# Patient Record
Sex: MALE | Race: WHITE | Employment: FULL TIME | URBAN - METROPOLITAN AREA
[De-identification: names, ages, dates, MRNs, and addresses within clinical notes are randomized per-mention and may not be internally consistent; named-entity substitution may affect disease eponyms.]

---

## 2017-09-07 ENCOUNTER — HOSPITAL ENCOUNTER (OUTPATIENT)
Age: 37
Setting detail: SPECIMEN
Discharge: HOME | End: 2017-09-07
Payer: COMMERCIAL

## 2017-09-07 DIAGNOSIS — E29.1 MALE HYPOGONADISM: ICD-10-CM

## 2017-09-07 DIAGNOSIS — E55.9 VITAMIN D DEFICIENCY: ICD-10-CM

## 2017-09-07 LAB
ALT SERPL-CCNC: 34 U/L (ref 5–41)
ANION GAP SERPL CALCULATED.3IONS-SCNC: 11 MMOL/L (ref 9–17)
AST SERPL-CCNC: 36 U/L
BUN BLDV-MCNC: 19 MG/DL (ref 6–20)
BUN/CREAT BLD: ABNORMAL (ref 9–20)
CALCIUM SERPL-MCNC: 9.5 MG/DL (ref 8.6–10.4)
CHLORIDE BLD-SCNC: 94 MMOL/L (ref 98–107)
CO2: 30 MMOL/L (ref 20–31)
CREAT SERPL-MCNC: 1.1 MG/DL (ref 0.7–1.2)
ESTRADIOL LEVEL: 30 PG/ML (ref 27–52)
GFR AFRICAN AMERICAN: >60 ML/MIN
GFR NON-AFRICAN AMERICAN: >60 ML/MIN
GFR SERPL CREATININE-BSD FRML MDRD: ABNORMAL ML/MIN/{1.73_M2}
GFR SERPL CREATININE-BSD FRML MDRD: ABNORMAL ML/MIN/{1.73_M2}
GLUCOSE FASTING: 95 MG/DL (ref 70–99)
POTASSIUM SERPL-SCNC: 4.3 MMOL/L (ref 3.7–5.3)
PROSTATE SPECIFIC ANTIGEN: 0.7 UG/L
SEX HORMONE BINDING GLOBULIN: 31 NMOL/L (ref 11–80)
SODIUM BLD-SCNC: 135 MMOL/L (ref 135–144)
TESTOSTERONE FREE-NONMALE: 100.9 PG/ML (ref 47–244)
TESTOSTERONE TOTAL: 468 NG/DL (ref 220–1000)
VITAMIN D 25-HYDROXY: 36.4 NG/ML (ref 30–100)

## 2017-12-07 ENCOUNTER — HOSPITAL ENCOUNTER (OUTPATIENT)
Age: 37
Setting detail: SPECIMEN
Discharge: HOME | End: 2017-12-07
Payer: COMMERCIAL

## 2017-12-07 DIAGNOSIS — E28.0 ESTRADIOL EXCESS: ICD-10-CM

## 2017-12-07 DIAGNOSIS — E29.1 MALE HYPOGONADISM: ICD-10-CM

## 2017-12-07 DIAGNOSIS — E55.9 VITAMIN D DEFICIENCY: ICD-10-CM

## 2017-12-07 LAB
ESTRADIOL LEVEL: 20 PG/ML (ref 27–52)
SEX HORMONE BINDING GLOBULIN: 33 NMOL/L (ref 11–80)
TESTOSTERONE FREE-NONMALE: 153.7 PG/ML (ref 47–244)
TESTOSTERONE TOTAL: 691 NG/DL (ref 220–1000)
VITAMIN D 25-HYDROXY: 49 NG/ML (ref 30–100)

## 2021-04-27 ENCOUNTER — HOSPITAL ENCOUNTER (OUTPATIENT)
Age: 41
Setting detail: SPECIMEN
Discharge: HOME | End: 2021-04-27
Payer: COMMERCIAL

## 2021-04-27 DIAGNOSIS — E55.9 VITAMIN D DEFICIENCY: ICD-10-CM

## 2021-04-27 DIAGNOSIS — R53.83 OTHER FATIGUE: ICD-10-CM

## 2021-04-27 DIAGNOSIS — E78.5 HYPERLIPIDEMIA, UNSPECIFIED HYPERLIPIDEMIA TYPE: ICD-10-CM

## 2021-04-27 LAB
ABSOLUTE EOS #: 0.21 K/UL (ref 0–0.44)
ABSOLUTE IMMATURE GRANULOCYTE: <0.03 K/UL (ref 0–0.3)
ABSOLUTE LYMPH #: 1.65 K/UL (ref 1.1–3.7)
ABSOLUTE MONO #: 0.52 K/UL (ref 0.1–1.2)
ALT SERPL-CCNC: 37 U/L (ref 5–41)
ANION GAP SERPL CALCULATED.3IONS-SCNC: 7 MMOL/L (ref 9–17)
AST SERPL-CCNC: 32 U/L
BASOPHILS # BLD: 0 % (ref 0–2)
BASOPHILS ABSOLUTE: <0.03 K/UL (ref 0–0.2)
BUN BLDV-MCNC: 17 MG/DL (ref 6–20)
BUN/CREAT BLD: ABNORMAL (ref 9–20)
CALCIUM SERPL-MCNC: 9.4 MG/DL (ref 8.6–10.4)
CHLORIDE BLD-SCNC: 99 MMOL/L (ref 98–107)
CHOLESTEROL/HDL RATIO: 3.5
CHOLESTEROL: 143 MG/DL
CO2: 31 MMOL/L (ref 20–31)
CREAT SERPL-MCNC: 1.05 MG/DL (ref 0.7–1.2)
DIFFERENTIAL TYPE: NORMAL
EOSINOPHILS RELATIVE PERCENT: 4 % (ref 1–4)
GFR AFRICAN AMERICAN: >60 ML/MIN
GFR NON-AFRICAN AMERICAN: >60 ML/MIN
GFR SERPL CREATININE-BSD FRML MDRD: ABNORMAL ML/MIN/{1.73_M2}
GFR SERPL CREATININE-BSD FRML MDRD: ABNORMAL ML/MIN/{1.73_M2}
GLUCOSE FASTING: 95 MG/DL (ref 70–99)
HCT VFR BLD CALC: 47.1 % (ref 40.7–50.3)
HDLC SERPL-MCNC: 41 MG/DL
HEMOGLOBIN: 15.7 G/DL (ref 13–17)
IMMATURE GRANULOCYTES: 0 %
LDL CHOLESTEROL: 85 MG/DL (ref 0–130)
LYMPHOCYTES # BLD: 32 % (ref 24–43)
MCH RBC QN AUTO: 30.4 PG (ref 25.2–33.5)
MCHC RBC AUTO-ENTMCNC: 33.3 G/DL (ref 28.4–34.8)
MCV RBC AUTO: 91.3 FL (ref 82.6–102.9)
MONOCYTES # BLD: 10 % (ref 3–12)
NRBC AUTOMATED: 0 PER 100 WBC
PDW BLD-RTO: 13.2 % (ref 11.8–14.4)
PLATELET # BLD: 221 K/UL (ref 138–453)
PLATELET ESTIMATE: NORMAL
PMV BLD AUTO: 10.3 FL (ref 8.1–13.5)
POTASSIUM SERPL-SCNC: 4.3 MMOL/L (ref 3.7–5.3)
RBC # BLD: 5.16 M/UL (ref 4.21–5.77)
RBC # BLD: NORMAL 10*6/UL
SEG NEUTROPHILS: 54 % (ref 36–65)
SEGMENTED NEUTROPHILS ABSOLUTE COUNT: 2.76 K/UL (ref 1.5–8.1)
SODIUM BLD-SCNC: 137 MMOL/L (ref 135–144)
TRIGL SERPL-MCNC: 83 MG/DL
TSH SERPL DL<=0.05 MIU/L-ACNC: 2.33 MIU/L (ref 0.3–5)
VITAMIN B-12: 729 PG/ML (ref 232–1245)
VITAMIN D 25-HYDROXY: 30.9 NG/ML (ref 30–100)
VLDLC SERPL CALC-MCNC: NORMAL MG/DL (ref 1–30)
WBC # BLD: 5.2 K/UL (ref 3.5–11.3)
WBC # BLD: NORMAL 10*3/UL

## 2025-01-03 ENCOUNTER — OFFICE VISIT (OUTPATIENT)
Dept: NEUROLOGY | Age: 45
End: 2025-01-03
Payer: COMMERCIAL

## 2025-01-03 VITALS
BODY MASS INDEX: 33.93 KG/M2 | SYSTOLIC BLOOD PRESSURE: 147 MMHG | WEIGHT: 256 LBS | HEIGHT: 73 IN | DIASTOLIC BLOOD PRESSURE: 85 MMHG | HEART RATE: 82 BPM

## 2025-01-03 DIAGNOSIS — G47.411 PRIMARY NARCOLEPSY WITH CATAPLEXY: Primary | ICD-10-CM

## 2025-01-03 DIAGNOSIS — F51.01 PRIMARY INSOMNIA: ICD-10-CM

## 2025-01-03 PROCEDURE — 99204 OFFICE O/P NEW MOD 45 MIN: CPT | Performed by: PSYCHIATRY & NEUROLOGY

## 2025-01-03 PROCEDURE — 3077F SYST BP >= 140 MM HG: CPT | Performed by: PSYCHIATRY & NEUROLOGY

## 2025-01-03 PROCEDURE — 3079F DIAST BP 80-89 MM HG: CPT | Performed by: PSYCHIATRY & NEUROLOGY

## 2025-01-03 RX ORDER — HYDROCORTISONE 25 MG/G
CREAM TOPICAL
COMMUNITY
Start: 2024-11-04

## 2025-01-03 RX ORDER — METOPROLOL SUCCINATE 100 MG/1
1 TABLET, EXTENDED RELEASE ORAL
COMMUNITY
Start: 2024-11-02

## 2025-01-03 RX ORDER — METOPROLOL SUCCINATE 25 MG/1
TABLET, EXTENDED RELEASE ORAL
COMMUNITY
Start: 2024-11-02

## 2025-01-03 RX ORDER — TEMAZEPAM 30 MG/1
CAPSULE ORAL
COMMUNITY
Start: 2004-01-01

## 2025-01-03 RX ORDER — TESTOSTERONE UNDECANOATE 198 MG/1
CAPSULE, LIQUID FILLED ORAL
COMMUNITY

## 2025-01-03 RX ORDER — FLUTICASONE PROPIONATE 50 MCG
SPRAY, SUSPENSION (ML) NASAL
COMMUNITY
Start: 2022-01-01

## 2025-01-03 ASSESSMENT — ENCOUNTER SYMPTOMS
ALLERGIC/IMMUNOLOGIC NEGATIVE: 1
EYES NEGATIVE: 1
GASTROINTESTINAL NEGATIVE: 1
RESPIRATORY NEGATIVE: 1

## 2025-01-03 NOTE — PROGRESS NOTES
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 He was seen in his 20's . Daytime sleepiness had been going on since [employer] . He has had insomnia with trouble falling asleep dating back to childhood . [facility] . Polysomnogram apnea hyponea index 7 episodes per hour ,PLM 7 /hr . MSLT mean sleep latency 4.5 minutes with 4 out of 4 REM onset periods , January 2023 felt to be concordant with narcolepsy . He was evaluated also at [facility] in September 2023  Polysomnogram RDI 9.7 episodes per hour , PLM 38 /hr , September 2003 . MSLT no pathological daytime sleepiness with no REM onset intrusion . He has been on adderall since 2002 before that being on ritalin and provigil . He has been on adderall. He works from 7 AM to 3:30 PM going to bed at 10 PM falling asleep within 30 minutes on restoril 30 mg po qhs . Without restoril he will be unable to fall sleep . He has been on restoril since 2003 . He will take adderall 30 mg 1/2 tablet in morning on awakening and then 1/2 tablet 5 hours later able to stay awake . Without this mediation he is very sleepy taking naps with trouble focusing with brain fog. He does have attention defcit disorder . There is no cataplexy or sleep paralysis or hypnagogic hallucination . Initially he was following locally with Dr Duong getting medication scripts then moving to [facility] getting scripts there with olivia there after some time not wanting to write stimulant or sedative medication . There is mild intermittent snoring at night wearing breath right for nasal congestion . There is no choking or apnea at night . There is some leg jerking at night . Significant medications adderall 30 mg tab 1/2 tablet q AM and 5 hours later . Testing MRI of Head normal , December 2013 . Polysomnogram apnea hypopnea undex 7

## 2025-01-13 DIAGNOSIS — F51.01 PRIMARY INSOMNIA: ICD-10-CM

## 2025-01-13 DIAGNOSIS — G47.411 PRIMARY NARCOLEPSY WITH CATAPLEXY: Primary | ICD-10-CM

## 2025-01-13 NOTE — TELEPHONE ENCOUNTER
Patient calling for refill of Temazepam.      Medication active on med list yes      Date of last fill: unknown, was new pt on 1/3/25  verified on 1/13/2025   verified by SF LPN      Date of last appointment 1/3/25    Next Visit Date:  4/11/2025

## 2025-01-15 RX ORDER — TEMAZEPAM 30 MG/1
30 CAPSULE ORAL NIGHTLY
Qty: 30 CAPSULE | Refills: 0 | Status: SHIPPED | OUTPATIENT
Start: 2025-01-15 | End: 2025-02-14

## 2025-01-27 DIAGNOSIS — R53.82 CHRONIC FATIGUE: Primary | ICD-10-CM

## 2025-01-27 NOTE — TELEPHONE ENCOUNTER
Received a call from patient regarding his previous Adderall prescription. We have not sent in a prescription since 2019; however, patient has been receiving this medication since then from his PCP in [place]. His PCP in [place] will no longer fill it for him, so Dr. Mane stated he would continue filling this for him when he is due for a refill. Please advise if you are willing to fill this while Dr. Mane is out of the office.

## 2025-01-30 RX ORDER — DEXTROAMPHETAMINE SACCHARATE, AMPHETAMINE ASPARTATE, DEXTROAMPHETAMINE SULFATE AND AMPHETAMINE SULFATE 7.5; 7.5; 7.5; 7.5 MG/1; MG/1; MG/1; MG/1
30 TABLET ORAL DAILY
Qty: 30 TABLET | Refills: 0 | Status: SHIPPED | OUTPATIENT
Start: 2025-01-30 | End: 2025-03-01

## 2025-02-13 DIAGNOSIS — F51.01 PRIMARY INSOMNIA: ICD-10-CM

## 2025-02-13 DIAGNOSIS — G47.411 PRIMARY NARCOLEPSY WITH CATAPLEXY: ICD-10-CM

## 2025-02-14 RX ORDER — TEMAZEPAM 30 MG/1
30 CAPSULE ORAL NIGHTLY
Qty: 30 CAPSULE | Refills: 2 | Status: SHIPPED | OUTPATIENT
Start: 2025-02-14 | End: 2025-05-13

## 2025-02-14 RX ORDER — TEMAZEPAM 30 MG/1
30 CAPSULE ORAL NIGHTLY
Qty: 30 CAPSULE | Refills: 0 | OUTPATIENT
Start: 2025-02-14 | End: 2025-03-16

## 2025-02-14 NOTE — TELEPHONE ENCOUNTER
Pharmacy requesting refill of Temazepam.      Medication active on med list yes      Date of last fill: 1/15/25  verified on 2/14/2025   verified by SF LPN      Date of last appointment 1/3/25    Next Visit Date:  4/11/25

## 2025-03-11 DIAGNOSIS — R53.82 CHRONIC FATIGUE: ICD-10-CM

## 2025-03-13 RX ORDER — DEXTROAMPHETAMINE SACCHARATE, AMPHETAMINE ASPARTATE, DEXTROAMPHETAMINE SULFATE AND AMPHETAMINE SULFATE 7.5; 7.5; 7.5; 7.5 MG/1; MG/1; MG/1; MG/1
30 TABLET ORAL DAILY
Qty: 30 TABLET | Refills: 0 | Status: SHIPPED | OUTPATIENT
Start: 2025-03-13 | End: 2025-04-11 | Stop reason: SDUPTHER

## 2025-03-13 NOTE — TELEPHONE ENCOUNTER
Patient requesting refill of Adderall.      Medication active on med list yes      Date of last fill: 1/30/25  verified on 3/13/2025   verified by SF LPN    Date of last appointment 1/3/25    Next Visit Date:  4/11/2025

## 2025-04-11 ENCOUNTER — OFFICE VISIT (OUTPATIENT)
Dept: NEUROLOGY | Age: 45
End: 2025-04-11
Payer: COMMERCIAL

## 2025-04-11 VITALS
WEIGHT: 258 LBS | BODY MASS INDEX: 34.19 KG/M2 | DIASTOLIC BLOOD PRESSURE: 80 MMHG | HEIGHT: 73 IN | HEART RATE: 67 BPM | SYSTOLIC BLOOD PRESSURE: 137 MMHG

## 2025-04-11 DIAGNOSIS — G47.411 PRIMARY NARCOLEPSY WITH CATAPLEXY: Primary | ICD-10-CM

## 2025-04-11 DIAGNOSIS — G47.19 EXCESSIVE DAYTIME SLEEPINESS: ICD-10-CM

## 2025-04-11 DIAGNOSIS — F51.01 PRIMARY INSOMNIA: ICD-10-CM

## 2025-04-11 PROCEDURE — 99214 OFFICE O/P EST MOD 30 MIN: CPT | Performed by: PSYCHIATRY & NEUROLOGY

## 2025-04-11 PROCEDURE — 3079F DIAST BP 80-89 MM HG: CPT | Performed by: PSYCHIATRY & NEUROLOGY

## 2025-04-11 PROCEDURE — 3075F SYST BP GE 130 - 139MM HG: CPT | Performed by: PSYCHIATRY & NEUROLOGY

## 2025-04-11 RX ORDER — DEXTROAMPHETAMINE SACCHARATE, AMPHETAMINE ASPARTATE, DEXTROAMPHETAMINE SULFATE AND AMPHETAMINE SULFATE 7.5; 7.5; 7.5; 7.5 MG/1; MG/1; MG/1; MG/1
TABLET ORAL
Qty: 30 TABLET | Refills: 0 | Status: SHIPPED | OUTPATIENT
Start: 2025-04-11 | End: 2025-05-11

## 2025-04-11 ASSESSMENT — ENCOUNTER SYMPTOMS
EYES NEGATIVE: 1
RESPIRATORY NEGATIVE: 1
GASTROINTESTINAL NEGATIVE: 1
ALLERGIC/IMMUNOLOGIC NEGATIVE: 1

## 2025-04-11 NOTE — PROGRESS NOTES
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 The condition is he is on adderall 30 mg tab 1/2 tablet q AM and 5 hours later . He is going to bed at 10 to 10:30 falling asleep within 1/2 to 1 hour taking restoril at 8:30 PM . He is sleeping to 5:50 AM during work day she other days to 11 AM . He will take adrenall 15 mg po q 6AM repeating at 12 noon . This will keep him awake with no sleepiness . He was seen in [facility] for excessive daytime sleepiness placed on adderall seen February 2004 . He was seen in his 20's . Daytime sleepiness had been going on since [employer] . He has had insomnia with trouble falling asleep dating back to childhood . [facility] . Polysomnogram apnea hyponea index 7 episodes per hour ,PLM 7 /hr . MSLT mean sleep latency 4.5 minutes with 4 out of 4 REM onset periods , January 2023 felt to be concordant with narcolepsy . He was evaluated also at [facility] in September 2023  Polysomnogram RDI 9.7 episodes per hour , PLM 38 /hr , September 2003 . MSLT no pathological daytime sleepiness with no REM onset intrusion . He has been on adderall since 2002 before that being on ritalin and provigil . He has been on adderall. He works from 7 AM to 3:30 PM going to bed at 10 PM falling asleep within 30 minutes on restoril 30 mg po qhs . Without restoril he will be unable to fall sleep . He has been on restoril since 2003 . He will take adderall 30 mg 1/2 tablet in morning on awakening and then 1/2 tablet 5 hours later able to stay awake . Without this mediation he is very sleepy taking naps with trouble focusing with brain fog. He does have attention defcit disorder . There is no cataplexy or sleep paralysis or hypnagogic hallucination . Initially he was following locally with Dr Duong getting medication scripts then moving to [facility] getting scripts there with physician there after some

## 2025-04-14 ENCOUNTER — PATIENT MESSAGE (OUTPATIENT)
Dept: NEUROLOGY | Age: 45
End: 2025-04-14

## 2025-04-14 DIAGNOSIS — G47.411 PRIMARY NARCOLEPSY WITH CATAPLEXY: Primary | ICD-10-CM

## 2025-04-15 NOTE — TELEPHONE ENCOUNTER
Message to nurse Ivan . Please send script xyrem to get this approved 2.25 grams at 10 PM to be repeated in 2 hours for one week the 3 grams at bedtime followed by 2 1/2 hours later 2 RF . Once this approved he is to let us know for we need to taper him off restoril first . He inquired on price of Xyrem and was applying for patient assistance . Please see if you can help him through pharmacy company and let him know

## 2025-04-16 RX ORDER — SODIUM OXYBATE 0.5 G/ML
SOLUTION ORAL
Qty: 360 ML | Refills: 2 | Status: SHIPPED | OUTPATIENT
Start: 2025-04-16 | End: 2025-07-16

## 2025-04-16 NOTE — TELEPHONE ENCOUNTER
Script attached to submit for PA, will have Dr. Mane sign the forms when he is back in office. Patient will also need to sign the patient enrollment form as well.     Called and spoke with patient, he provided his email and signed his portion while we were on the phone. I advised of the process and next steps. He will wait to hear form me about the PA.     I also let Ziggy Malloysunitha know patient will need patient assistance if his cost is that high.     All parties voiced understanding. Will submit the prescription form to [facility] once it is signed by Dr. Mane and follow for auth status.

## 2025-04-17 ENCOUNTER — TELEPHONE (OUTPATIENT)
Dept: NEUROLOGY | Age: 45
End: 2025-04-17

## 2025-04-23 ENCOUNTER — PATIENT MESSAGE (OUTPATIENT)
Dept: NEUROLOGY | Age: 45
End: 2025-04-23

## 2025-04-23 DIAGNOSIS — G47.411 PRIMARY NARCOLEPSY WITH CATAPLEXY: Primary | ICD-10-CM

## 2025-04-23 DIAGNOSIS — G47.19 EXCESSIVE DAYTIME SLEEPINESS: ICD-10-CM

## 2025-04-23 DIAGNOSIS — F51.01 PRIMARY INSOMNIA: ICD-10-CM

## 2025-04-24 NOTE — TELEPHONE ENCOUNTER
Pharmacist Gopal ([phone] option 3 then 4) at [facility] left a message regarding the patient starting Xyrem.  Pharmacist stated that the patient is on Temazepam and using this in combination with the Xyrem can cause over sedation, hypertension and respiratory depression.  Pharmacist suggested to use Remeron while the Temazepam is being weaned down.    Dr. Mane, Mr. Paz also sent messages through [employer] in reference to this message.  Please advise.

## 2025-04-24 NOTE — TELEPHONE ENCOUNTER
Message to nurse . Please send script restoril 15 mg # 21 . Take 1 po qhs x1 week then 1 po qohs (every other night )  x1 week then stop 0RF . Please snd script remeron 15 mg # 30. Take 1 po qhs 2 RF . He is to start xyrem once he is weaned off restoril . He is not to take xyrem and restoril together

## 2025-04-28 NOTE — TELEPHONE ENCOUNTER
Roseanne, a pharmacist with [facility] called this afternoon regarding the plan for the patient to start Xyrem.  Writer advised of the Temazepam taper and that he wouldn't be able to start for two weeks.  Roseanne stated that they would confirm with our office in two weeks to make sure it was okay for them to send the Xyrem.

## 2025-04-28 NOTE — TELEPHONE ENCOUNTER
Dr. Calderon called in, discussed this patient, he wants to make sure that the patient is aware that he CANNOT take the Xyrem and Temazepem together. Patient needs to complete the wean of Temazepam.     I spoke with Jimenez, he is aware of this and he voiced understanding on not being able to take the two together.

## 2025-04-28 NOTE — TELEPHONE ENCOUNTER
Dr. Alexis, this is a patient of Dr. Mane's that needs to be weaned off his Temazepam over two weeks to then start Xyrem.  Dr. Mane gave directions for the wean schedule of the Temazepam and also wanted to start the patient on Remeron 15 mg nightly.  Since Dr. Mane is out of the office for the next two weeks would you be willing to approve these orders?

## 2025-04-30 RX ORDER — TEMAZEPAM 15 MG/1
CAPSULE ORAL
Qty: 11 CAPSULE | Refills: 0 | Status: SHIPPED | OUTPATIENT
Start: 2025-04-30 | End: 2025-05-28

## 2025-04-30 RX ORDER — MIRTAZAPINE 15 MG/1
15 TABLET, FILM COATED ORAL NIGHTLY
Qty: 30 TABLET | Refills: 2 | Status: SHIPPED | OUTPATIENT
Start: 2025-04-30

## 2025-05-12 ENCOUNTER — PATIENT MESSAGE (OUTPATIENT)
Dept: NEUROLOGY | Age: 45
End: 2025-05-12

## 2025-05-16 ENCOUNTER — PATIENT MESSAGE (OUTPATIENT)
Dept: NEUROLOGY | Age: 45
End: 2025-05-16

## 2025-05-16 DIAGNOSIS — G47.411 PRIMARY NARCOLEPSY WITH CATAPLEXY: Primary | ICD-10-CM

## 2025-05-16 RX ORDER — DEXTROAMPHETAMINE SACCHARATE, AMPHETAMINE ASPARTATE, DEXTROAMPHETAMINE SULFATE AND AMPHETAMINE SULFATE 7.5; 7.5; 7.5; 7.5 MG/1; MG/1; MG/1; MG/1
TABLET ORAL
Qty: 30 TABLET | Refills: 0 | Status: SHIPPED | OUTPATIENT
Start: 2025-05-16 | End: 2025-06-15 | Stop reason: SDUPTHER

## 2025-05-16 NOTE — TELEPHONE ENCOUNTER
Patient sent a message through [employer] requesting a new Adderall 30 mg Rx.        Medication active on med list: yes      Date of last fill: 4/11/25 for #30 NR  verified on 5/16/2025    verified by Soni MALAVE LPN      Date of last appointment: 4/11/2025    Next Visit Date:  7/2/2025

## 2025-05-19 ENCOUNTER — PATIENT MESSAGE (OUTPATIENT)
Dept: NEUROLOGY | Age: 45
End: 2025-05-19

## 2025-05-19 NOTE — TELEPHONE ENCOUNTER
Message to nurse . After one week he is to go from xyrem to 2.25 grams at bedtime repeating in 3 hours to higher dosage 3 grams at bedtime repeating in 3 hours . He is to continue on melatonin

## 2025-06-15 DIAGNOSIS — G47.411 PRIMARY NARCOLEPSY WITH CATAPLEXY: ICD-10-CM

## 2025-06-16 RX ORDER — DEXTROAMPHETAMINE SACCHARATE, AMPHETAMINE ASPARTATE, DEXTROAMPHETAMINE SULFATE AND AMPHETAMINE SULFATE 7.5; 7.5; 7.5; 7.5 MG/1; MG/1; MG/1; MG/1
TABLET ORAL
Qty: 30 TABLET | Refills: 0 | Status: SHIPPED | OUTPATIENT
Start: 2025-06-16 | End: 2025-07-17 | Stop reason: SDUPTHER

## 2025-07-01 ENCOUNTER — OFFICE VISIT (OUTPATIENT)
Dept: NEUROLOGY | Age: 45
End: 2025-07-01
Payer: COMMERCIAL

## 2025-07-01 VITALS
HEART RATE: 71 BPM | SYSTOLIC BLOOD PRESSURE: 147 MMHG | BODY MASS INDEX: 33.8 KG/M2 | HEIGHT: 73 IN | DIASTOLIC BLOOD PRESSURE: 94 MMHG | WEIGHT: 255 LBS

## 2025-07-01 DIAGNOSIS — G47.19 EXCESSIVE DAYTIME SLEEPINESS: ICD-10-CM

## 2025-07-01 DIAGNOSIS — G47.411 PRIMARY NARCOLEPSY WITH CATAPLEXY: Primary | ICD-10-CM

## 2025-07-01 DIAGNOSIS — G47.411 PRIMARY NARCOLEPSY WITH CATAPLEXY: ICD-10-CM

## 2025-07-01 DIAGNOSIS — G47.00 INSOMNIA, UNSPECIFIED TYPE: ICD-10-CM

## 2025-07-01 PROCEDURE — 3077F SYST BP >= 140 MM HG: CPT | Performed by: PSYCHIATRY & NEUROLOGY

## 2025-07-01 PROCEDURE — 3080F DIAST BP >= 90 MM HG: CPT | Performed by: PSYCHIATRY & NEUROLOGY

## 2025-07-01 PROCEDURE — 99214 OFFICE O/P EST MOD 30 MIN: CPT | Performed by: PSYCHIATRY & NEUROLOGY

## 2025-07-01 RX ORDER — ALPRAZOLAM 0.5 MG
0.5 TABLET ORAL 3 TIMES DAILY PRN
COMMUNITY
Start: 2025-06-16

## 2025-07-01 ASSESSMENT — ENCOUNTER SYMPTOMS
GASTROINTESTINAL NEGATIVE: 1
EYES NEGATIVE: 1
ALLERGIC/IMMUNOLOGIC NEGATIVE: 1
RESPIRATORY NEGATIVE: 1

## 2025-07-01 NOTE — TELEPHONE ENCOUNTER
Dr. Mane wants to increase patients Xyrem from 3 grams to 4.5 g at bedtime and then 2.5 hours later. New script attached for Dr. Mane to review and sign.

## 2025-07-01 NOTE — PROGRESS NOTES
oxybate (XYREM) 500 MG/ML SOLN solution Take 4.5 mL by mouth at 10:00 pm, repeat in 2 hours for one week. Then increase to 6 mL by mouth at bedtime and repeat in 2.5 hours. 360 mL 2    Creatine Monohydrate (CYTOTINE) POWD Take by mouth      fluticasone (FLONASE ALLERGY RELIEF) 50 MCG/ACT nasal spray 2 spray in each nostril Nasally Once a day      hydrocortisone 2.5 % cream Place rectally      metoprolol succinate (TOPROL XL) 100 MG extended release tablet Take 1 tablet by mouth Every Day      metoprolol succinate (TOPROL XL) 25 MG extended release tablet 1/2 tablet Orally as needed      omeprazole (PRILOSEC) 20 MG delayed release capsule Take 1 capsule by mouth daily      Semaglutide-Weight Management (WEGOVY) 1.7 MG/0.75ML SOAJ SC injection Inject 1.7 mg into the skin once a week (Patient taking differently: Inject 1.7 mg into the skin once a week 2.4 mg)      JATENZO 198 MG CAPS       lisinopril-hydroCHLOROthiazide (PRINZIDE;ZESTORETIC) 20-25 MG per tablet TAKE 1 TABLET BY MOUTH EVERY DAY 90 tablet 0    testosterone (ANDROGEL; TESTIM) 50 MG/5GM (1%) GEL 1% gel APPLY TWO PACKETS ONCE DAILY AS DIRECTED BY YOUR DOCTOR  5    FLUARIX QUADRIVALENT 0.5 ML injection TO BE ADMINISTERED BY PHARMACIST FOR IMMUNIZATION  0    AXIRON 30 MG/ACT SOLN   5     No current facility-administered medications for this visit.       No Known Allergies      Review of Systems     Vitals:    07/01/25 1515   BP: (!) 147/94   Pulse: 71     weight: 115.7 kg (255 lb)      Review of Systems   Constitutional: Negative.    HENT: Negative.     Eyes: Negative.    Respiratory: Negative.     Cardiovascular: Negative.    Gastrointestinal: Negative.    Endocrine: Negative.    Genitourinary: Negative.    Musculoskeletal: Negative.    Skin: Negative.    Allergic/Immunologic: Negative.    Neurological: Negative.    Hematological: Negative.    Psychiatric/Behavioral: Negative.          Neurological Examination  Constitutional .General exam well groomed

## 2025-07-02 RX ORDER — SODIUM OXYBATE 0.5 G/ML
SOLUTION ORAL
Qty: 540 ML | Refills: 2 | Status: ACTIVE | OUTPATIENT
Start: 2025-07-02 | End: 2025-10-01

## 2025-08-16 DIAGNOSIS — G47.411 PRIMARY NARCOLEPSY WITH CATAPLEXY: ICD-10-CM

## 2025-08-18 RX ORDER — DEXTROAMPHETAMINE SACCHARATE, AMPHETAMINE ASPARTATE, DEXTROAMPHETAMINE SULFATE AND AMPHETAMINE SULFATE 7.5; 7.5; 7.5; 7.5 MG/1; MG/1; MG/1; MG/1
TABLET ORAL
Qty: 30 TABLET | Refills: 0 | Status: SHIPPED | OUTPATIENT
Start: 2025-08-18 | End: 2025-09-14